# Patient Record
Sex: FEMALE | Race: WHITE | Employment: FULL TIME
[De-identification: names, ages, dates, MRNs, and addresses within clinical notes are randomized per-mention and may not be internally consistent; named-entity substitution may affect disease eponyms.]

---

## 2017-01-10 DIAGNOSIS — I10 HYPERTENSION GOAL BP (BLOOD PRESSURE) < 130/80: ICD-10-CM

## 2017-01-10 DIAGNOSIS — E78.5 HYPERLIPIDEMIA LDL GOAL <130: Primary | ICD-10-CM

## 2017-01-10 NOTE — TELEPHONE ENCOUNTER
atorvastatin (LIPITOR) 10 MG tablet     Last Written Prescription Date: 10/11/16  Last Fill Quantity: 90, # refills: 0  Last Office Visit with [facility], [facility] or [facility] prescribing provider: 3/15/16       CHOL      262   1/18/2016  HDL       56   1/18/2016  LDL      179   1/18/2016  LDL      173   12/17/2012  TRIG      135   1/18/2016  CHOLHDLRATIO      6.0   12/4/2013    lisinopril (PRINIVIL,ZESTRIL) 20 MG tablet      Last Written Prescription Date: 10/11/16  Last Fill Quantity: 180, # refills: 0  Last Office Visit with [facility], [facility] or [facility] prescribing provider: 3/15/16       POTASSIUM   Date Value Ref Range Status   03/15/2016 4.1 3.4 - 5.3 mmol/L Final     CREATININE   Date Value Ref Range Status   03/15/2016 0.71 0.52 - 1.04 mg/dL Final     BP Readings from Last 3 Encounters:   03/17/16 121/74   03/15/16 136/68   03/07/16 143/92

## 2017-01-11 RX ORDER — ATORVASTATIN CALCIUM 10 MG/1
10 TABLET, FILM COATED ORAL DAILY
Qty: 90 TABLET | Refills: 0 | Status: SHIPPED
Start: 2017-01-11 | End: 2017-04-10

## 2017-01-11 RX ORDER — LISINOPRIL 20 MG/1
20 TABLET ORAL 2 TIMES DAILY
Qty: 180 TABLET | Refills: 0 | Status: SHIPPED
Start: 2017-01-11 | End: 2017-04-10

## 2017-04-10 DIAGNOSIS — E78.5 HYPERLIPIDEMIA LDL GOAL <130: ICD-10-CM

## 2017-04-10 DIAGNOSIS — I10 HYPERTENSION GOAL BP (BLOOD PRESSURE) < 130/80: ICD-10-CM

## 2017-04-10 RX ORDER — LISINOPRIL 20 MG/1
20 TABLET ORAL 2 TIMES DAILY
Qty: 180 TABLET | Refills: 0 | Status: SHIPPED | OUTPATIENT
Start: 2017-04-10

## 2017-04-10 RX ORDER — LISINOPRIL 20 MG/1
20 TABLET ORAL 2 TIMES DAILY
Qty: 180 TABLET | Refills: 0 | Status: SHIPPED | OUTPATIENT
Start: 2017-04-10 | End: 2017-04-10

## 2017-04-10 RX ORDER — ATORVASTATIN CALCIUM 10 MG/1
10 TABLET, FILM COATED ORAL DAILY
Qty: 90 TABLET | Refills: 0 | Status: SHIPPED | OUTPATIENT
Start: 2017-04-10 | End: 2017-04-10

## 2017-04-10 RX ORDER — ATORVASTATIN CALCIUM 10 MG/1
10 TABLET, FILM COATED ORAL DAILY
Qty: 90 TABLET | Refills: 0 | Status: SHIPPED | OUTPATIENT
Start: 2017-04-10

## 2017-04-10 NOTE — TELEPHONE ENCOUNTER
One refill only! Needs appointment it has been over one year, was advised last refill that she needs an appointment

## 2017-04-10 NOTE — TELEPHONE ENCOUNTER
Atorvastatin      Last Written Prescription Date: 1/11/17  Last Fill Quantity: 90, # refills: 0  Last Office Visit with [facility] or [facility] prescribing provider: 3/16/16 Dr. Castaneda       Lab Results   Component Value Date    CHOL 262 01/18/2016     Lab Results   Component Value Date    HDL 56 01/18/2016     Lab Results   Component Value Date     01/18/2016     Lab Results   Component Value Date    TRIG 135 01/18/2016     Lab Results   Component Value Date    CHOLHDLRATIO 6.0 12/04/2013     Lisinopril       Last Written Prescription Date: 1/11/17   Last Fill Quantity: 180, # refills: 0  Last Office Visit with [facility] or [facility] prescribing provider: 3/16/16 AMEE Castaneda  [facility]         Potassium   Date Value Ref Range Status   03/15/2016 4.1 3.4 - 5.3 mmol/L Final     Creatinine   Date Value Ref Range Status   03/15/2016 0.71 0.52 - 1.04 mg/dL Final     BP Readings from Last 3 Encounters:   03/17/16 121/74   03/15/16 136/68   03/07/16 (!) 143/92

## 2017-06-28 DIAGNOSIS — E78.5 HYPERLIPIDEMIA LDL GOAL <130: ICD-10-CM

## 2017-06-28 DIAGNOSIS — I10 HYPERTENSION GOAL BP (BLOOD PRESSURE) < 130/80: ICD-10-CM

## 2017-06-29 RX ORDER — ATORVASTATIN CALCIUM 10 MG/1
TABLET, FILM COATED ORAL
Qty: 90 TABLET | OUTPATIENT
Start: 2017-06-29

## 2017-06-29 RX ORDER — LISINOPRIL 20 MG/1
TABLET ORAL
Qty: 180 TABLET | OUTPATIENT
Start: 2017-06-29

## 2017-06-29 NOTE — TELEPHONE ENCOUNTER
Pt moved to [place]-No longer a [place] pt. She will call pharmacy to let them know her new pcp. Bernice Giles RN

## 2017-06-29 NOTE — TELEPHONE ENCOUNTER
lisinopril      Last Written Prescription Date: 4/10/2017  Last Fill Quantity: 180, # refills: 0  Last Office Visit with [facility] or [facility] prescribing provider: 3/15/2016       Potassium   Date Value Ref Range Status   03/15/2016 4.1 3.4 - 5.3 mmol/L Final     Creatinine   Date Value Ref Range Status   03/15/2016 0.71 0.52 - 1.04 mg/dL Final     BP Readings from Last 3 Encounters:   03/17/16 121/74   03/15/16 136/68   03/07/16 (!) 143/92     atorvastatin (LIPITOR) 10 MG tablet     Last Written Prescription Date: 4/10/2017  Last Fill Quantity: 90, # refills: 0  Last Office Visit with [facility] or [facility] prescribing provider: 3/15/2016       Lab Results   Component Value Date    CHOL 262 01/18/2016     Lab Results   Component Value Date    HDL 56 01/18/2016     Lab Results   Component Value Date     01/18/2016     Lab Results   Component Value Date    TRIG 135 01/18/2016     Lab Results   Component Value Date    CHOLHDLRATIO 6.0 12/04/2013

## 2017-09-03 ENCOUNTER — HEALTH MAINTENANCE LETTER (OUTPATIENT)
Age: 67
End: 2017-09-03

## 2018-07-02 ENCOUNTER — HEALTH MAINTENANCE LETTER (OUTPATIENT)
Age: 68
End: 2018-07-02

## 2018-09-10 ENCOUNTER — HEALTH MAINTENANCE LETTER (OUTPATIENT)
Age: 68
End: 2018-09-10

## 2020-02-23 ENCOUNTER — HEALTH MAINTENANCE LETTER (OUTPATIENT)
Age: 70
End: 2020-02-23

## 2020-12-06 ENCOUNTER — HEALTH MAINTENANCE LETTER (OUTPATIENT)
Age: 70
End: 2020-12-06

## 2021-04-11 ENCOUNTER — HEALTH MAINTENANCE LETTER (OUTPATIENT)
Age: 71
End: 2021-04-11

## 2021-09-25 ENCOUNTER — HEALTH MAINTENANCE LETTER (OUTPATIENT)
Age: 71
End: 2021-09-25

## 2022-03-12 ENCOUNTER — HEALTH MAINTENANCE LETTER (OUTPATIENT)
Age: 72
End: 2022-03-12

## 2022-05-07 ENCOUNTER — HEALTH MAINTENANCE LETTER (OUTPATIENT)
Age: 72
End: 2022-05-07

## 2022-12-26 ENCOUNTER — HEALTH MAINTENANCE LETTER (OUTPATIENT)
Age: 72
End: 2022-12-26

## 2023-06-02 ENCOUNTER — HEALTH MAINTENANCE LETTER (OUTPATIENT)
Age: 73
End: 2023-06-02